# Patient Record
(demographics unavailable — no encounter records)

---

## 2025-06-17 NOTE — HISTORY OF PRESENT ILLNESS
[de-identified] : WC 10/19/2022  06/17/2025: 67 y/o female presents with exacerbation of left knee pain for the past week. No recent injury. She was initially injured during a fall at work in 2022 (MRI demonstrates MMT/OA). She was treated with PT at that time. Describes medial knee pain with limited ROM. Worse with standing. She is now using a cane for stability. She has been using advil/tylenol with mild relief. 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

## 2025-06-17 NOTE — ASSESSMENT
[FreeTextEntry1] : 06/17/2025: LT knee x-rays, 4 views, reveals end stage OA.  Underlying pathology reviewed and treatment options discussed. We discussed the findings of arthritis and the potential treatment options including CSI, visco injections, and PT. She is a candidate for LT TKA. She will continue with a non-operative approach until she is ready to discuss surgery.  Obtain authorization for visco injections. Activity modification as tolerated. Prescribed Meloxicam 15 mg - I discussed the proper use of this medication and potential side effects. Tramadol prescribed. I discussed the proper use of this medication as well as potential side effects. She needs assistance with ambulation. Will submit Parking Permit forms.  Questions addressed. Follow up after auth.   The documentation recorded by the scribe accurately reflects the service I personally performed and the decisions made by me. I, Austen Nava, attest that this documentation has been prepared under the direction and in the presence of Provider David Waller MD.   The patient was seen by David Waller MD.

## 2025-06-17 NOTE — DISCUSSION/SUMMARY
[de-identified] : The patient was advised of the diagnosis. The natural history of the pathology was explained in full to the patient in layman's terms. The risks and benefits of surgical and non-surgical treatment alternatives were explained in full to the patient. All questions were answered.  The risks, benefits, and alternatives to Viscosupplementation injection were explained in full to the patient. Risks outlined include but are not limited to infection, sepsis, bleeding, scarring, skin discoloration, temporary increase in pain, syncopal episode, failure to resolve symptoms, allergic reaction, and symptom recurrence. Signs and symptoms of infection reviewed and patient advised to call immediately for redness, fevers, and/or chills. Patient understood the risks. All questions were answered. After discussion of options, patient requested Viscosupplementation. Authorization was submitted to the patient's insurance company. Once approval is received, patient will be contacted to set up injection appointment.

## 2025-06-17 NOTE — DISCUSSION/SUMMARY
[de-identified] : The patient was advised of the diagnosis. The natural history of the pathology was explained in full to the patient in layman's terms. The risks and benefits of surgical and non-surgical treatment alternatives were explained in full to the patient. All questions were answered.  The risks, benefits, and alternatives to Viscosupplementation injection were explained in full to the patient. Risks outlined include but are not limited to infection, sepsis, bleeding, scarring, skin discoloration, temporary increase in pain, syncopal episode, failure to resolve symptoms, allergic reaction, and symptom recurrence. Signs and symptoms of infection reviewed and patient advised to call immediately for redness, fevers, and/or chills. Patient understood the risks. All questions were answered. After discussion of options, patient requested Viscosupplementation. Authorization was submitted to the patient's insurance company. Once approval is received, patient will be contacted to set up injection appointment.

## 2025-06-17 NOTE — HISTORY OF PRESENT ILLNESS
[de-identified] : WC 10/19/2022  06/17/2025: 69 y/o female presents with exacerbation of left knee pain for the past week. No recent injury. She was initially injured during a fall at work in 2022 (MRI demonstrates MMT/OA). She was treated with PT at that time. Describes medial knee pain with limited ROM. Worse with standing. She is now using a cane for stability. She has been using advil/tylenol with mild relief. 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